# Patient Record
Sex: MALE | Race: WHITE | NOT HISPANIC OR LATINO | Employment: UNEMPLOYED | URBAN - METROPOLITAN AREA
[De-identification: names, ages, dates, MRNs, and addresses within clinical notes are randomized per-mention and may not be internally consistent; named-entity substitution may affect disease eponyms.]

---

## 2017-11-10 ENCOUNTER — TELEPHONE (OUTPATIENT)
Dept: TRANSPLANT | Facility: CLINIC | Age: 52
End: 2017-11-10

## 2017-11-10 NOTE — TELEPHONE ENCOUNTER
Spoke to pts sister, her complaints were about phone calls with the infectious disease department. I listened but in the end, told her I was not infectious disease. I informed her that her brothers records were sent to infectious disease. I will call for additional records in am. Pt is tenatively scheduled in hepatology.  He does have HCV but also has large ascites per sister and was told he has a mass on his last ultrasound.  She agreed he will not be initially seen for hCV till we determine cirrhosis status(meld) etc. No labs sent.

## 2017-11-13 ENCOUNTER — TELEPHONE (OUTPATIENT)
Dept: TRANSPLANT | Facility: CLINIC | Age: 52
End: 2017-11-13

## 2017-11-13 NOTE — TELEPHONE ENCOUNTER
Pt called accepted appt on wed 11/14. Instructed to fast after midnight inc. They want imaging. Pt agreed

## 2017-11-15 ENCOUNTER — HOSPITAL ENCOUNTER (OUTPATIENT)
Dept: RADIOLOGY | Facility: HOSPITAL | Age: 52
Discharge: HOME | End: 2017-11-15
Attending: PHYSICIAN ASSISTANT
Payer: MEDICAID

## 2017-11-15 ENCOUNTER — OFFICE VISIT (OUTPATIENT)
Dept: HEPATOLOGY | Facility: CLINIC | Age: 52
End: 2017-11-15
Payer: MEDICAID

## 2017-11-15 ENCOUNTER — TELEPHONE (OUTPATIENT)
Dept: INTERVENTIONAL RADIOLOGY/VASCULAR | Facility: HOSPITAL | Age: 52
End: 2017-11-15

## 2017-11-15 VITALS
HEART RATE: 96 BPM | SYSTOLIC BLOOD PRESSURE: 150 MMHG | TEMPERATURE: 97 F | OXYGEN SATURATION: 97 % | HEIGHT: 67 IN | WEIGHT: 141.31 LBS | BODY MASS INDEX: 22.18 KG/M2 | DIASTOLIC BLOOD PRESSURE: 78 MMHG | RESPIRATION RATE: 18 BRPM

## 2017-11-15 DIAGNOSIS — B18.2 CHRONIC HEPATITIS C WITHOUT HEPATIC COMA: ICD-10-CM

## 2017-11-15 DIAGNOSIS — R18.8 OTHER ASCITES: ICD-10-CM

## 2017-11-15 DIAGNOSIS — B18.2 CHRONIC HEPATITIS C WITHOUT HEPATIC COMA: Primary | ICD-10-CM

## 2017-11-15 PROCEDURE — 99204 OFFICE O/P NEW MOD 45 MIN: CPT | Mod: S$PBB,,, | Performed by: PHYSICIAN ASSISTANT

## 2017-11-15 PROCEDURE — 99999 PR PBB SHADOW E&M-EST. PATIENT-LVL IV: CPT | Mod: PBBFAC,,, | Performed by: PHYSICIAN ASSISTANT

## 2017-11-15 PROCEDURE — 99214 OFFICE O/P EST MOD 30 MIN: CPT | Mod: PBBFAC,25 | Performed by: PHYSICIAN ASSISTANT

## 2017-11-15 PROCEDURE — 74160 CT ABDOMEN W/CONTRAST: CPT | Mod: 26,,, | Performed by: RADIOLOGY

## 2017-11-15 PROCEDURE — 74160 CT ABDOMEN W/CONTRAST: CPT | Mod: TC

## 2017-11-15 PROCEDURE — 25500020 PHARM REV CODE 255

## 2017-11-15 RX ORDER — ACETAMINOPHEN 325 MG/1
325 TABLET ORAL EVERY 6 HOURS PRN
COMMUNITY

## 2017-11-15 RX ORDER — FAMOTIDINE 20 MG/1
TABLET, FILM COATED ORAL
Refills: 0 | COMMUNITY
Start: 2017-09-20

## 2017-11-15 RX ORDER — LACTULOSE 10 G/15ML
SOLUTION ORAL; RECTAL CONTINUOUS PRN
COMMUNITY

## 2017-11-15 NOTE — TELEPHONE ENCOUNTER
Phone call (complete to pt)   Arrival time-    9      Allergies reviewed  Directions given  Instructed to take meds in AM             Labs ( current )

## 2017-11-15 NOTE — PROGRESS NOTES
I have personally performed a face to face diagnostic evaluation on this patient. I have reviewed and agree with today's findings and the care plan outlined by Raymond Khan PA-C      My findings are as follows:  Patient presents with new onset ascites.    - new diagnosis of HCV infection  - also excessive alcohol use for years    Likely cirrhosis  Need to exclude HCC  May need OLT.    - start evaluation of his liver disease       he will return to Raymond Khan PA-C  for follow-up.

## 2017-11-15 NOTE — PROGRESS NOTES
HEPATOLOGY CLINIC VISIT NOTE     REFERRING PROVIDER: Mouna Eason NP     REASON FOR VISIT: ascites, HCV     HISTORY: This is a 52 y.o. White male here for evalauation of ascites and HCV, referred by PCP. Pt was in his normal state of health until 3 months ago when he started having abdominal distention. His PCP started him on lactulose. He was tested for HCV which was positive. Per his sister,pt reported to ED for abdominal pain where she reports he was told about a liver mass- no imaging included in records. Additionally, I have no recent LFTs. He does have chronic hepatitis C, genotype 1a; HCV ,000 IU/mL. He has noticeable ascites on exam with tense abdominal distention. He has never had a paracentesis. He has scleral icterus Pt denies signs of hepatic decompensation including: dark urine, hematemesis, melena, slowed mentation.    We discussed that pt likely has cirrhosis based on clinical presentation alone.      Pt admits that he does not routinely seek medical care. He denies any significant PMH.       Liver staging:  No formal staging, suspect cirrhosis based on long alcohol use and HCV  Ascites and scleral icterus  No recent LFTs     Cirrhosis history:  - Decompensated with ascites   - MELD score   -- will order labs to asses meld     - HCC screening:   - U/S: ?liver mass, will start with tpCT   - AFP: ordered today     (?)Portal HTN:   - EGD: ordered, has not had colonoscopy, will order.       Denies jaundice, dark urine, abdominal distention, hematemesis, melena, slowed mentation.   No abnormal skin rashes. No generalized joint pain.                     Past Medical History:   Diagnosis Date    Chronic hepatitis C      Past Surgical History:   Procedure Laterality Date    HAND SURGERY      TYMPANOSTOMY TUBE PLACEMENT       FAMILY HISTORY: Negative for liver disease    SOCIAL HISTORY:   History   Smoking Status    Not on file   Smokeless Tobacco    Not on file       History   Alcohol use Not  on file   beer- daily, 8-10 beers     History   Drug use: Unknown     ROS:   No fever, chills, weight loss, fatigue  No chest pain, , dyspnea, cough  (+) abdominal pain, no change in bowel pattern, nausea, vomiting  No dysuria, hematuria   No skin rashes   No headaches, visual changes  No lower extremity edema  No depression or anxiety      PHYSICAL EXAM:  Friendly White male, in no acute distress; alert and oriented to person, place and time  VITALS: reviewed  HEENT: Sclerae anicteric.   NECK: Supple  CVS: Regular rate and rhythm. No murmurs  LUNGS: Normal respiratory effort. Clear bilaterally  ABDOMEN: Flat, soft, nontender. No organomegaly or masses. (+) ascites  SKIN: Warm and dry. No jaundice, No obvious rashes.   EXTREMITIES: No lower extremity edema  NEURO/PSYCH: Normal gate. Memory intact. Thought and speech pattern appropriate. Behavior normal. No depression or anxiety noted.    RECENT LABS:  No results found for: WBC, HGB, PLT  No results found for: INR  No results found for: AST, ALT, BILITOT, ALBUMIN, ALKPHOS, CREATININE, BUN, NA, K, AFP      RECENT IMAGING:  none    ASSESSMENT  52 y.o. White male with:  1. DECOMPENSATED CIRRHOSIS WITH ASCITES  -- less likely, alcoholic hepatitis  -- will do full serological work up, assess LFTs and MELD  -- per sister reports a liver mass on outside imaging, will get AFP and tpCT here    - HCC screening:   - U/S: ?liver mass, will start with tpCT   - AFP: ordered today     (?)Portal HTN:   - EGD: ordered, has not had colonoscopy, will order.     2. ASCITES  -- will coordinate paracentesis  -- WBC, protein, albumin and culture  -- if CMP WNL, will start lasix 20mg and spironolactone 50mg.     3. CHRONIC HEPATITIS C  -- genotype 1a  -- treatment naive  -- HIV and hep A and B serologies    EDUCATION:  Discussed evidence that indicates that pt has cirrhosis.   -- Discussed the basics about liver fibrosis /scarring and how that relates to liver function. Reviewed the  significance of the MELD scoring system as it relates to indication for transplant.  -- Signs and symptoms of hepatic decompensation were reviewed, including jaundice, ascites, and slowed mentation due to hepatic encephalopathy. The patient should seek medical attention if any of these things occur. We discussed the potential for bleeding from esophageal varices with symptoms of hematemesis and melena. The patient should report to the Emergency Department for these symptoms.   -- We discussed the increased risk of hepatocellular carcinoma due to cirrhosis.   Continued screening every six months with ultrasound and AFP is recommended.   -- Discussed portal hypertension, including potential development of esophageal varices. Role of EGD in screening for varices was reviewed.   Cirrhosis education booklet given to pt    Recommended patient avoid alcohol and raw seafood. Limit tylenol to 2000mg daily    The natural history of Hepatitis C, including potential progression to cirrhosis was reviewed. Complications of cirrhosis, including hepatic decompensation, liver cancer, liver failure, need for liver transplant, and death were reviewed.     We discussed the increased progression of liver disease secondary to alcohol use; patient was advised to avoid alcohol completely.     Transmission of Hepatitis C was reviewed, including possible sexual transmission. Sexual contacts should be screened.     Risk of vertical transmission of Hepatitis C from mother to baby was reviewed. Children should be screened.     Patient should avoid sharing personal products such as razors, toothbrushes, etc.     We reviewed that vaccination against Hepatitis A and Hepatitis B is recommended if patient does not already have immunity.    Recommend avoiding raw seafood.    Limit acetaminophen to 2000mg daily.    PLAN:  1. Labs today  2. CT  3. Paracentesis  4. Follow up visit in 3 weeks     Thank you for allowing me to participate in the care of  Shahid Khan PA-C

## 2017-11-15 NOTE — LETTER
November 16, 2017      Mouna Eason, [facility]  [address]  [facility] [address]           [facility] - Hepatology  [address]  [facility] [address]  Phone: [phone]  Fax: [phone]          Patient: Shahid Vizcarra   MR Number: [id number]   YOB: 1965   Date of Visit: 11/15/2017       Dear Mouna Eason:    Thank you for referring Shahid Vizcarra to me for evaluation. Attached you will find relevant portions of my assessment and plan of care.    If you have questions, please do not hesitate to call me. I look forward to following Shahid Vizcarra along with you.    Sincerely,    Raymond Khan PA-C    Enclosure  CC:  No Recipients    If you would like to receive this communication electronically, please contact [email or web address] or [phone] to request more information on [facility] access.    For providers and/or their staff who would like to refer a patient to [facility], please contact us through our one-stop-shop provider referral line, [facility], at [phone].    If you feel you have received this communication in error or would no longer like to receive these types of communications, please e-mail [email or web address]

## 2017-11-16 ENCOUNTER — TELEPHONE (OUTPATIENT)
Dept: HEPATOLOGY | Facility: CLINIC | Age: 52
End: 2017-11-16

## 2017-11-16 ENCOUNTER — HOSPITAL ENCOUNTER (OUTPATIENT)
Dept: INTERVENTIONAL RADIOLOGY/VASCULAR | Facility: HOSPITAL | Age: 52
Discharge: HOME | End: 2017-11-16
Attending: PHYSICIAN ASSISTANT
Payer: MEDICAID

## 2017-11-16 VITALS
SYSTOLIC BLOOD PRESSURE: 148 MMHG | OXYGEN SATURATION: 97 % | HEART RATE: 94 BPM | DIASTOLIC BLOOD PRESSURE: 80 MMHG | RESPIRATION RATE: 18 BRPM

## 2017-11-16 DIAGNOSIS — R18.8 OTHER ASCITES: ICD-10-CM

## 2017-11-16 DIAGNOSIS — B18.2 CHRONIC HEPATITIS C WITHOUT HEPATIC COMA: ICD-10-CM

## 2017-11-16 PROCEDURE — 76705 ECHO EXAM OF ABDOMEN: CPT | Mod: TC

## 2017-11-16 PROCEDURE — 76705 ECHO EXAM OF ABDOMEN: CPT | Mod: 26,,, | Performed by: RADIOLOGY

## 2017-11-16 RX ORDER — FUROSEMIDE 20 MG/1
20 TABLET ORAL DAILY
Qty: 30 TABLET | Refills: 1 | Status: SHIPPED | OUTPATIENT
Start: 2017-11-16 | End: 2017-12-11 | Stop reason: SDUPTHER

## 2017-11-16 RX ORDER — SPIRONOLACTONE 50 MG/1
50 TABLET, FILM COATED ORAL DAILY
Qty: 30 TABLET | Refills: 1 | Status: SHIPPED | OUTPATIENT
Start: 2017-11-16 | End: 2017-12-11 | Stop reason: SDUPTHER

## 2017-11-16 NOTE — PROGRESS NOTES
During ultrasound evaluation, no ascites identified for a safe paracentesis. No paracentesis performed

## 2017-11-16 NOTE — TELEPHONE ENCOUNTER
Patient: Shahid Vizcarra       MRN: [id number]      : 1965     Age: 52 y.o.  [address] [address]    Provider: YUSUF - JORDYN Berman    Patient Transplant Status: Not a candidate    Reason for presentation: Indeterminate lesion    Clinical Summary: This is a 52 y.o. White male , who  was in his normal state of health until 3 months ago when he developed abdominal distention. He was tested for HCV which was positive. He established care in hepatology but did not come with LFTs but has documentation to support HCV.  Per his sister,pt reported to ED for abdominal pain where she reports he was told about a liver mass- no imaging included in records. His AFP was 910 . Given h/o liver mass, tpCT ordered, multiple lesions concerning for HCC. Portal vein thrombus noted as well.       Imaging to be reviewed: tpCT    HCC Treatment History: n/a    ABO:     Platelets:   Lab Results   Component Value Date/Time     11/15/2017 11:15 AM     Creatinine:   Lab Results   Component Value Date/Time    CREATININE 0.8 11/15/2017 11:15 AM     Bilirubin:   Lab Results   Component Value Date/Time    BILITOT 4.3 (H) 11/15/2017 11:15 AM     AFP Last 3 each if available:   Lab Results   Component Value Date/Time     (H) 11/15/2017 11:15 AM       MELD: MELD-Na score: 14 at 11/15/2017 11:15 AM  MELD score: 14 at 11/15/2017 11:15 AM  Calculated from:  Serum Creatinine: 0.8 mg/dL (Rounded to 1) at 11/15/2017 11:15 AM  Serum Sodium: 137 mmol/L at 11/15/2017 11:15 AM  Total Bilirubin: 4.3 mg/dL at 11/15/2017 11:15 AM  INR(ratio): 1.2 at 11/15/2017 11:15 AM  Age: 52 years    Plan:     Follow-up Provider:

## 2017-11-16 NOTE — H&P
Radiology History & Physical      SUBJECTIVE:     Chief Complaint: abdominal distention    History of Present Illness:  Shahid Vizcarra is a 52 y.o. male who presents for ultrasound guided paracentesis  Past Medical History:   Diagnosis Date    Chronic hepatitis C      Past Surgical History:   Procedure Laterality Date    HAND SURGERY      TYMPANOSTOMY TUBE PLACEMENT         Home Meds:   Prior to Admission medications    Medication Sig Start Date End Date Taking? Authorizing Provider   acetaminophen (TYLENOL) 325 MG tablet Take 325 mg by mouth every 6 (six) hours as needed for Pain.    Historical Provider, MD   famotidine (PEPCID) 20 MG tablet TK 1 T PO Q 12 H PRF ABDOMINAL DISCOMFORT 9/20/17   Historical Provider, MD   furosemide (LASIX) 20 MG tablet Take 1 tablet (20 mg total) by mouth once daily. 11/16/17 11/16/18  Raymond Khan PA-C   lactulose (CHRONULAC) 10 gram/15 mL solution Take by mouth continuous prn.    Historical Provider, MD   spironolactone (ALDACTONE) 50 MG tablet Take 1 tablet (50 mg total) by mouth once daily. 11/16/17 11/16/18  Raymond Khan PA-C     Anticoagulants/Antiplatelets: no anticoagulation    Allergies: Review of patient's allergies indicates:  No Known Allergies  Sedation History:  no adverse reactions    Review of Systems:   Hematological: no known coagulopathies  Respiratory: no shortness of breath  Cardiovascular: no chest pain  Gastrointestinal: no abdominal pain  Genito-Urinary: no dysuria  Musculoskeletal: negative  Neurological: no TIA or stroke symptoms         OBJECTIVE:     Vital Signs (Most Recent)  Pulse: 94 (11/16/17 0900)  Resp: 18 (11/16/17 0900)  BP: (!) 148/80 (11/16/17 0900)  SpO2: 97 % (11/16/17 0900)    Physical Exam:  ASA: 2  Mallampati: n/a    General: no acute distress  Mental Status: alert and oriented to person, place and time  HEENT: normocephalic, atraumatic  Chest: unlabored breathing  Heart: regular heart rate  Abdomen: distended  Extremity: moves all  extremities    Laboratory  Lab Results   Component Value Date    INR 1.2 11/15/2017       Lab Results   Component Value Date    WBC 9.29 11/15/2017    HGB 14.6 11/15/2017    HCT 42.9 11/15/2017     (H) 11/15/2017     11/15/2017      Lab Results   Component Value Date    GLU 87 11/15/2017     11/15/2017    K 4.1 11/15/2017    CL 99 11/15/2017    CO2 25 11/15/2017    BUN 12 11/15/2017    CREATININE 0.8 11/15/2017    CALCIUM 9.7 11/15/2017     (H) 11/15/2017     (H) 11/15/2017    ALBUMIN 2.5 (L) 11/15/2017    BILITOT 4.3 (H) 11/15/2017    BILIDIR 2.7 (H) 11/15/2017       ASSESSMENT/PLAN:     Sedation Plan: local  Patient will undergo ultrasound guided paracentesis.    LIGIA Izaguirre, FNP  Interventional Radiology  [phone] [employer]

## 2017-11-17 ENCOUNTER — TELEPHONE (OUTPATIENT)
Dept: HEPATOLOGY | Facility: CLINIC | Age: 52
End: 2017-11-17

## 2017-11-17 DIAGNOSIS — K74.60 CIRRHOSIS OF LIVER WITHOUT ASCITES, UNSPECIFIED HEPATIC CIRRHOSIS TYPE: Primary | ICD-10-CM

## 2017-11-17 DIAGNOSIS — R18.8 OTHER ASCITES: ICD-10-CM

## 2017-11-17 NOTE — TELEPHONE ENCOUNTER
Patient: Shahid Vizcarra       MRN: [id number]      : 1965     Age: 52 y.o.  [address] [address]    Provider: YUSUF - LES Berman    Patient Transplant Status: Not a candidate    Reason for presentation: Indeterminate lesion    Clinical Summary: This is a 52 y.o. White male , who  was in his normal state of health until 3 months ago when he developed abdominal distention. He was tested for HCV which was positive. He established care in hepatology but did not come with LFTs but has documentation to support HCV.  Per his sister,pt reported to ED for abdominal pain where she reports he was told about a liver mass- no imaging included in records. His AFP was 910 . Given h/o liver mass, tpCT ordered, multiple lesions concerning for HCC. Portal vein thrombus noted as well.       Imaging to be reviewed: tpCT    HCC Treatment History: n/a    ABO:     Platelets:   Lab Results   Component Value Date/Time     11/15/2017 11:15 AM     Creatinine:   Lab Results   Component Value Date/Time    CREATININE 0.8 11/15/2017 11:15 AM     Bilirubin:   Lab Results   Component Value Date/Time    BILITOT 4.3 (H) 11/15/2017 11:15 AM     AFP Last 3 each if available:   Lab Results   Component Value Date/Time     (H) 11/15/2017 11:15 AM       MELD: MELD-Na score: 14 at 11/15/2017 11:15 AM  MELD score: 14 at 11/15/2017 11:15 AM  Calculated from:  Serum Creatinine: 0.8 mg/dL (Rounded to 1) at 11/15/2017 11:15 AM  Serum Sodium: 137 mmol/L at 11/15/2017 11:15 AM  Total Bilirubin: 4.3 mg/dL at 11/15/2017 11:15 AM  INR(ratio): 1.2 at 11/15/2017 11:15 AM  Age: 52 years    Plan: Diffiusely heterogeneous appearance on CT. Atleast 4 lesions identified with washout (Segment II at 4.2 cm), segment 2 at 1.2 cm, sement VI at 3.2 cm, and IV at 1.6 cm. I suspect there are multiple more and would recommend MRI.  Partial PVT.  tbili 4.3    Follow-up Provider: Les Berman

## 2017-11-17 NOTE — TELEPHONE ENCOUNTER
Pt called to discuss CT scan concerning for HCC. Pt sister also phoned as well. Scan submitted for IR review. Pt started lasix and spironolactone today.     Please schedule for BMP in 1 week    Thanks

## 2017-11-21 ENCOUNTER — TELEPHONE (OUTPATIENT)
Dept: HEPATOLOGY | Facility: CLINIC | Age: 52
End: 2017-11-21

## 2017-11-21 ENCOUNTER — CONFERENCE (OUTPATIENT)
Dept: TRANSPLANT | Facility: CLINIC | Age: 52
End: 2017-11-21

## 2017-11-21 DIAGNOSIS — R16.0 LIVER MASS: ICD-10-CM

## 2017-11-21 DIAGNOSIS — K74.60 CIRRHOSIS OF LIVER WITHOUT ASCITES, UNSPECIFIED HEPATIC CIRRHOSIS TYPE: Primary | ICD-10-CM

## 2017-11-21 DIAGNOSIS — K62.5 BRBPR (BRIGHT RED BLOOD PER RECTUM): ICD-10-CM

## 2017-11-21 NOTE — TELEPHONE ENCOUNTER
----- Message from Clarisse Matute RN sent at 11/21/2017 10:29 AM CST -----  I spoke with patient.  He reports losing 4 lbs after start of diuretics.  Patient reports that for the last 2 weeks approximately 95% of the time he's passing blood with each stool.  He states that approximately a tablespoon of blood is lost each time and that blood clots look dark red.  He does a history of hemmrroids.

## 2017-11-24 ENCOUNTER — TELEPHONE (OUTPATIENT)
Dept: ENDOSCOPY | Facility: HOSPITAL | Age: 52
End: 2017-11-24

## 2017-11-24 ENCOUNTER — LAB VISIT (OUTPATIENT)
Dept: LAB | Facility: HOSPITAL | Age: 52
End: 2017-11-24
Payer: MEDICAID

## 2017-11-24 DIAGNOSIS — Z12.11 SPECIAL SCREENING FOR MALIGNANT NEOPLASMS, COLON: Primary | ICD-10-CM

## 2017-11-24 DIAGNOSIS — K74.60 CIRRHOSIS OF LIVER WITHOUT ASCITES, UNSPECIFIED HEPATIC CIRRHOSIS TYPE: ICD-10-CM

## 2017-11-24 DIAGNOSIS — K74.69 OTHER CIRRHOSIS OF LIVER: Primary | ICD-10-CM

## 2017-11-24 DIAGNOSIS — R18.8 OTHER ASCITES: ICD-10-CM

## 2017-11-24 LAB
ANION GAP SERPL CALC-SCNC: 6 MMOL/L
BUN SERPL-MCNC: 10 MG/DL
CALCIUM SERPL-MCNC: 9.1 MG/DL
CHLORIDE SERPL-SCNC: 101 MMOL/L
CO2 SERPL-SCNC: 25 MMOL/L
CREAT SERPL-MCNC: 0.8 MG/DL
EST. GFR  (AFRICAN AMERICAN): >60 ML/MIN/1.73 M^2
EST. GFR  (NON AFRICAN AMERICAN): >60 ML/MIN/1.73 M^2
GLUCOSE SERPL-MCNC: 132 MG/DL
POTASSIUM SERPL-SCNC: 4.2 MMOL/L
SODIUM SERPL-SCNC: 132 MMOL/L

## 2017-11-24 PROCEDURE — 80048 BASIC METABOLIC PNL TOTAL CA: CPT

## 2017-11-24 PROCEDURE — 36415 COLL VENOUS BLD VENIPUNCTURE: CPT

## 2017-11-24 RX ORDER — POLYETHYLENE GLYCOL 3350, SODIUM SULFATE ANHYDROUS, SODIUM BICARBONATE, SODIUM CHLORIDE, POTASSIUM CHLORIDE 236; 22.74; 6.74; 5.86; 2.97 G/4L; G/4L; G/4L; G/4L; G/4L
4 POWDER, FOR SOLUTION ORAL ONCE
Qty: 4000 ML | Refills: 0 | Status: SHIPPED | OUTPATIENT
Start: 2017-11-24 | End: 2017-11-24

## 2017-11-27 DIAGNOSIS — K74.60 CIRRHOSIS OF LIVER WITHOUT ASCITES, UNSPECIFIED HEPATIC CIRRHOSIS TYPE: Primary | ICD-10-CM

## 2017-11-28 ENCOUNTER — TELEPHONE (OUTPATIENT)
Dept: HEPATOLOGY | Facility: CLINIC | Age: 52
End: 2017-11-28

## 2017-11-28 NOTE — TELEPHONE ENCOUNTER
----- Message from Raymond Khan PA-C sent at 11/27/2017  3:50 PM CST -----  Please let him know that these labs are stable  Thanks

## 2017-11-30 ENCOUNTER — TELEPHONE (OUTPATIENT)
Dept: HEPATOLOGY | Facility: CLINIC | Age: 52
End: 2017-11-30

## 2017-11-30 NOTE — TELEPHONE ENCOUNTER
Patient: Shahid Vizcarra       MRN: [id number]      : 1965     Age: 52 y.o.  [address] [address]    Provider: YUSUF - JORDYN Berman    Patient Transplant Status: Not a candidate    Reason for presentation: Indeterminate lesion    Clinical Summary: This is a 52 y.o. White male , who  was in his normal state of health until 3 months ago when he developed abdominal distention. He was tested for HCV which was positive. He established care in hepatology but did not come with LFTs but has documentation to support HCV.  Per his sister,pt reported to ED for abdominal pain where she reports he was told about a liver mass- no imaging included in records. His AFP was 910 . Given h/o liver mass, tpCT ordered, multiple lesions concerning for HCC. Portal vein thrombus noted as well. MRI scheduled     Imaging to be reviewed: tpCT, MRI    HCC Treatment History: n/a    ABO:     Platelets:   Lab Results   Component Value Date/Time     11/15/2017 11:15 AM     Creatinine:   Lab Results   Component Value Date/Time    CREATININE 0.8 2017 11:06 AM     Bilirubin:   Lab Results   Component Value Date/Time    BILITOT 4.3 (H) 11/15/2017 11:15 AM     AFP Last 3 each if available:   Lab Results   Component Value Date/Time     (H) 11/15/2017 11:15 AM       MELD: MELD-Na score: 14 at 11/15/2017 11:15 AM  MELD score: 14 at 11/15/2017 11:15 AM  Calculated from:  Serum Creatinine: 0.8 mg/dL (Rounded to 1) at 11/15/2017 11:15 AM  Serum Sodium: 137 mmol/L at 11/15/2017 11:15 AM  Total Bilirubin: 4.3 mg/dL at 11/15/2017 11:15 AM  INR(ratio): 1.2 at 11/15/2017 11:15 AM  Age: 52 years    Plan:     Follow-up Provider:

## 2017-11-30 NOTE — TELEPHONE ENCOUNTER
Pt notified of stable lab results via VM left onpts VM today asked pt to contact us back with any further questions or concerns.

## 2017-11-30 NOTE — TELEPHONE ENCOUNTER
----- Message from Raymond Khan PA-C sent at 11/29/2017  3:32 PM CST -----  Please let him know that these labs are stable   Thanks

## 2017-12-01 ENCOUNTER — HOSPITAL ENCOUNTER (OUTPATIENT)
Dept: RADIOLOGY | Facility: HOSPITAL | Age: 52
Discharge: HOME | End: 2017-12-01
Attending: PHYSICIAN ASSISTANT
Payer: MEDICAID

## 2017-12-01 DIAGNOSIS — K74.60 CIRRHOSIS OF LIVER WITHOUT ASCITES, UNSPECIFIED HEPATIC CIRRHOSIS TYPE: ICD-10-CM

## 2017-12-01 DIAGNOSIS — R16.0 LIVER MASS: ICD-10-CM

## 2017-12-01 PROCEDURE — 25500020 PHARM REV CODE 255: Performed by: PHYSICIAN ASSISTANT

## 2017-12-01 PROCEDURE — A9585 GADOBUTROL INJECTION: HCPCS | Performed by: PHYSICIAN ASSISTANT

## 2017-12-01 PROCEDURE — 74183 MRI ABD W/O CNTR FLWD CNTR: CPT | Mod: 26,,, | Performed by: RADIOLOGY

## 2017-12-01 PROCEDURE — 74183 MRI ABD W/O CNTR FLWD CNTR: CPT | Mod: TC

## 2017-12-01 RX ORDER — GADOBUTROL 604.72 MG/ML
10 INJECTION INTRAVENOUS
Status: COMPLETED | OUTPATIENT
Start: 2017-12-01 | End: 2017-12-01

## 2017-12-01 RX ADMIN — GADOBUTROL 10 ML: 604.72 INJECTION INTRAVENOUS at 04:12

## 2017-12-01 NOTE — TELEPHONE ENCOUNTER
Patient: Shahid Vizcarra       MRN: [id number]      : 1965     Age: 52 y.o.  [address] [address]    Provider: YUSUF - JORDYN Berman    Patient Transplant Status: Not a candidate    Reason for presentation: Indeterminate lesion    Clinical Summary: This is a 52 y.o. White male , who  was in his normal state of health until 3 months ago when he developed abdominal distention. He was tested for HCV which was positive. He established care in hepatology but did not come with LFTs but has documentation to support HCV.  Per his sister,pt reported to ED for abdominal pain where she reports he was told about a liver mass- no imaging included in records. His AFP was 910 . Given h/o liver mass, tpCT ordered, multiple lesions concerning for HCC. Portal vein thrombus noted as well. MRI scheduled     Imaging to be reviewed: tpCT, MRI    HCC Treatment History: n/a    ABO:     Platelets:   Lab Results   Component Value Date/Time     11/15/2017 11:15 AM     Creatinine:   Lab Results   Component Value Date/Time    CREATININE 0.8 2017 11:06 AM     Bilirubin:   Lab Results   Component Value Date/Time    BILITOT 4.3 (H) 11/15/2017 11:15 AM     AFP Last 3 each if available:   Lab Results   Component Value Date/Time     (H) 11/15/2017 11:15 AM       MELD: MELD-Na score: 14 at 11/15/2017 11:15 AM  MELD score: 14 at 11/15/2017 11:15 AM  Calculated from:  Serum Creatinine: 0.8 mg/dL (Rounded to 1) at 11/15/2017 11:15 AM  Serum Sodium: 137 mmol/L at 11/15/2017 11:15 AM  Total Bilirubin: 4.3 mg/dL at 11/15/2017 11:15 AM  INR(ratio): 1.2 at 11/15/2017 11:15 AM  Age: 52 years    Plan: MRI and CT show diffuse, infiltrative type process, with some discrete masses which demonstrate washout and are consistent with HCC. 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 Would not offer liver directed therapy. Rec: referral to Hospice.    Follow-up Provider: JORDYN Berman

## 2017-12-05 ENCOUNTER — CONFERENCE (OUTPATIENT)
Dept: TRANSPLANT | Facility: CLINIC | Age: 52
End: 2017-12-05

## 2017-12-05 ENCOUNTER — ANESTHESIA (OUTPATIENT)
Dept: ENDOSCOPY | Facility: HOSPITAL | Age: 52
End: 2017-12-05
Payer: MEDICAID

## 2017-12-05 ENCOUNTER — ANESTHESIA EVENT (OUTPATIENT)
Dept: ENDOSCOPY | Facility: HOSPITAL | Age: 52
End: 2017-12-05
Payer: MEDICAID

## 2017-12-05 ENCOUNTER — SURGERY (OUTPATIENT)
Age: 52
End: 2017-12-05

## 2017-12-05 ENCOUNTER — HOSPITAL ENCOUNTER (OUTPATIENT)
Facility: HOSPITAL | Age: 52
Discharge: HOME | End: 2017-12-05
Attending: INTERNAL MEDICINE | Admitting: INTERNAL MEDICINE
Payer: MEDICAID

## 2017-12-05 VITALS
DIASTOLIC BLOOD PRESSURE: 70 MMHG | WEIGHT: 136 LBS | HEART RATE: 88 BPM | SYSTOLIC BLOOD PRESSURE: 126 MMHG | BODY MASS INDEX: 21.35 KG/M2 | OXYGEN SATURATION: 99 % | HEIGHT: 67 IN | TEMPERATURE: 98 F | RESPIRATION RATE: 16 BRPM

## 2017-12-05 VITALS — RESPIRATION RATE: 19 BRPM

## 2017-12-05 DIAGNOSIS — K74.60 CIRRHOSIS OF LIVER WITHOUT ASCITES, UNSPECIFIED HEPATIC CIRRHOSIS TYPE: Primary | ICD-10-CM

## 2017-12-05 PROCEDURE — D9220A PRA ANESTHESIA: Mod: 33,CRNA,, | Performed by: NURSE ANESTHETIST, CERTIFIED REGISTERED

## 2017-12-05 PROCEDURE — 88305 TISSUE EXAM BY PATHOLOGIST: CPT | Performed by: PATHOLOGY

## 2017-12-05 PROCEDURE — D9220A PRA ANESTHESIA: Mod: 33,ANES,, | Performed by: ANESTHESIOLOGY

## 2017-12-05 PROCEDURE — 63600175 PHARM REV CODE 636 W HCPCS: Performed by: NURSE ANESTHETIST, CERTIFIED REGISTERED

## 2017-12-05 PROCEDURE — 25000003 PHARM REV CODE 250: Performed by: INTERNAL MEDICINE

## 2017-12-05 PROCEDURE — 27201022: Performed by: INTERNAL MEDICINE

## 2017-12-05 PROCEDURE — 27201089 HC SNARE, DISP (ANY): Performed by: INTERNAL MEDICINE

## 2017-12-05 PROCEDURE — 43244 EGD VARICES LIGATION: CPT | Mod: 51,,, | Performed by: INTERNAL MEDICINE

## 2017-12-05 PROCEDURE — 37000009 HC ANESTHESIA EA ADD 15 MINS: Performed by: INTERNAL MEDICINE

## 2017-12-05 PROCEDURE — 45385 COLONOSCOPY W/LESION REMOVAL: CPT | Mod: ,,, | Performed by: INTERNAL MEDICINE

## 2017-12-05 PROCEDURE — 45385 COLONOSCOPY W/LESION REMOVAL: CPT | Performed by: INTERNAL MEDICINE

## 2017-12-05 PROCEDURE — 37000008 HC ANESTHESIA 1ST 15 MINUTES: Performed by: INTERNAL MEDICINE

## 2017-12-05 PROCEDURE — 88305 TISSUE EXAM BY PATHOLOGIST: CPT | Mod: 26,,, | Performed by: PATHOLOGY

## 2017-12-05 PROCEDURE — 43244 EGD VARICES LIGATION: CPT | Performed by: INTERNAL MEDICINE

## 2017-12-05 RX ORDER — SODIUM CHLORIDE 9 MG/ML
INJECTION, SOLUTION INTRAVENOUS CONTINUOUS
Status: DISCONTINUED | OUTPATIENT
Start: 2017-12-05 | End: 2017-12-05 | Stop reason: HOSPADM

## 2017-12-05 RX ORDER — LIDOCAINE HCL/PF 100 MG/5ML
SYRINGE (ML) INTRAVENOUS
Status: DISCONTINUED | OUTPATIENT
Start: 2017-12-05 | End: 2017-12-05

## 2017-12-05 RX ORDER — PROPOFOL 10 MG/ML
VIAL (ML) INTRAVENOUS
Status: DISCONTINUED | OUTPATIENT
Start: 2017-12-05 | End: 2017-12-05

## 2017-12-05 RX ORDER — PROPOFOL 10 MG/ML
VIAL (ML) INTRAVENOUS CONTINUOUS PRN
Status: DISCONTINUED | OUTPATIENT
Start: 2017-12-05 | End: 2017-12-05

## 2017-12-05 RX ADMIN — PROPOFOL 100 MG: 10 INJECTION, EMULSION INTRAVENOUS at 10:12

## 2017-12-05 RX ADMIN — LIDOCAINE HYDROCHLORIDE 40 MG: 20 INJECTION, SOLUTION INTRAVENOUS at 10:12

## 2017-12-05 RX ADMIN — PROPOFOL 40 MG: 10 INJECTION, EMULSION INTRAVENOUS at 10:12

## 2017-12-05 RX ADMIN — PROPOFOL 50 MG: 10 INJECTION, EMULSION INTRAVENOUS at 10:12

## 2017-12-05 RX ADMIN — PROPOFOL 150 MCG/KG/MIN: 10 INJECTION, EMULSION INTRAVENOUS at 10:12

## 2017-12-05 RX ADMIN — SODIUM CHLORIDE: 0.9 INJECTION, SOLUTION INTRAVENOUS at 09:12

## 2017-12-05 NOTE — PATIENT INSTRUCTIONS
Discharge Summary/Instructions after an Endoscopic Procedure  Patient Name: Shahid Vizcarra  Patient MRN: [id number]  Patient YOB: 1965  Tuesday, December 05, 2017  Theodore Umaña MD  RESTRICTIONS:  During your procedure today, you received medications for sedation.  These   medications may affect your judgment, balance and coordination.  Therefore,   for 24 hours, you have the following restrictions:   - DO NOT drive a car, operate machinery, make legal/financial decisions,   sign important papers or drink alcohol.    ACTIVITY:  The following day: return to full activity including work, except no heavy   lifting, straining or running for 3 days if polyps were removed.  DIET:  Eat and drink normally unless instructed otherwise.     TREATMENT FOR COMMON SIDE EFFECTS:  - Mild abdominal pain, belching, bloating or excessive gas: rest, eat   lightly and use a heating pad.  - Sore Throat: treat with throat lozenges and/or gargle with warm salt   water.  SYMPTOMS TO WATCH FOR AND REPORT TO YOUR PHYSICIAN:  1. Abdominal pain or bloating, other than gas cramps.  2. Chest pain.  3. Back pain.  4. Chills or fever occurring within 24 hours after the procedure.  5. Rectal bleeding, which would show as bright red, maroon, or black stools.   (A tablespoon of blood from the rectum is not serious, especially if   hemorrhoids are present.)  6. Vomiting.  7. Weakness or dizziness.  8. Because air was used during the procedure, expelling large amounts of air   from your rectum or belching is normal.  9. If a bowel prep was taken, you may not have a bowel movement for 1-3   days.  This is normal.  GO DIRECTLY TO THE NEAREST EMERGENCY ROOM IF YOU HAVE ANY OF THE FOLLOWING:      Difficulty breathing  Chills and/or fever over 101 F   Persistent vomiting and/or vomiting blood   Severe abdominal pain   Severe chest pain   Black, tarry stools   Bleeding- more than one tablespoon   Any other symptom or condition that you may feel needs  urgent attention  Your doctor recommends these additional instructions:  If any biopsies were taken, your doctor s clinic will contact you in 1 to 2   weeks with any results.  You have a contact number available for emergencies.  The signs and symptoms   of potential delayed complications were discussed with you.  You may return   to normal activities tomorrow.  Written discharge instructions were   provided to you.   You are being discharged to home.   Resume your previous diet.   Continue your present medications.   Your physician has recommended a repeat upper endoscopy in one month for   retreatment.   Return to your liver clinic as previously scheduled.  For questions, problems or results please call your physician - Theodore Umaña MD at Work:  [phone].  [facility] [place], EMERGENCY ROOM PHONE NUMBER: [phone]  IF A COMPLICATION OR EMERGENCY SITUATION ARISES AND YOU ARE UNABLE TO REACH   YOUR PHYSICIAN - GO DIRECTLY TO THE EMERGENCY ROOM.  Theodore Umaña MD  12/5/2017 10:14:10 AM  This report has been verified and signed electronically.

## 2017-12-05 NOTE — ANESTHESIA PREPROCEDURE EVALUATION
12/05/2017  Shahid Vizcarra is a 52 y.o., male.  Patient Active Problem List   Diagnosis    Chronic hepatitis C without hepatic coma    Cirrhosis of liver without ascites    Liver mass       Anesthesia Evaluation    I have reviewed the Patient Summary Reports.    I have reviewed the Nursing Notes.   I have reviewed the Medications.     Review of Systems      Physical Exam  General:  Well nourished    Airway/Jaw/Neck:  Airway Findings: Mouth Opening: Normal General Airway Assessment: Adult  Mallampati: II  Improves to II with phonation.  Jaw/Neck Findings:  Limited Ability to Prognath  Neck ROM: Normal ROM     Eyes/Ears/Nose:  Eyes/Ears/Nose Findings:    Dental:  Dental Findings: In tact   Chest/Lungs:  Chest/Lungs Findings: Clear to auscultation, Normal Respiratory Rate     Heart/Vascular:  Heart Findings: Rate: Normal  Rhythm: Regular Rhythm  Sounds: Normal     Abdomen:  Abdomen Findings:  Normal     Musculoskeletal:  Musculoskeletal Findings:    Skin:  Skin Findings:     Mental Status:  Mental Status Findings:  Cooperative, Alert and Oriented         Anesthesia Plan  Type of Anesthesia, risks & benefits discussed:  Anesthesia Type:  general, MAC  Patient's Preference:   Intra-op Monitoring Plan:   Intra-op Monitoring Plan Comments:   Post Op Pain Control Plan:   Post Op Pain Control Plan Comments:   Induction:   IV  Beta Blocker:  Patient is not currently on a Beta-Blocker (No further documentation required).       Informed Consent: Patient understands risks and agrees with Anesthesia plan.  Questions answered. Anesthesia consent signed with patient.  ASA Score: 3     Day of Surgery Review of History & Physical:    H&P update referred to the surgeon.         Ready For Surgery From Anesthesia Perspective.

## 2017-12-05 NOTE — PATIENT INSTRUCTIONS
Discharge Summary/Instructions after an Endoscopic Procedure  Patient Name: Shahid Vizcarra  Patient MRN: [id number]  Patient YOB: 1965  Tuesday, December 05, 2017  Theodore Umaña MD  RESTRICTIONS:  During your procedure today, you received medications for sedation.  These   medications may affect your judgment, balance and coordination.  Therefore,   for 24 hours, you have the following restrictions:   - DO NOT drive a car, operate machinery, make legal/financial decisions,   sign important papers or drink alcohol.    ACTIVITY:  The following day: return to full activity including work, except no heavy   lifting, straining or running for 3 days if polyps were removed.  DIET:  Eat and drink normally unless instructed otherwise.     TREATMENT FOR COMMON SIDE EFFECTS:  - Mild abdominal pain, belching, bloating or excessive gas: rest, eat   lightly and use a heating pad.  - Sore Throat: treat with throat lozenges and/or gargle with warm salt   water.  SYMPTOMS TO WATCH FOR AND REPORT TO YOUR PHYSICIAN:  1. Abdominal pain or bloating, other than gas cramps.  2. Chest pain.  3. Back pain.  4. Chills or fever occurring within 24 hours after the procedure.  5. Rectal bleeding, which would show as bright red, maroon, or black stools.   (A tablespoon of blood from the rectum is not serious, especially if   hemorrhoids are present.)  6. Vomiting.  7. Weakness or dizziness.  8. Because air was used during the procedure, expelling large amounts of air   from your rectum or belching is normal.  9. If a bowel prep was taken, you may not have a bowel movement for 1-3   days.  This is normal.  GO DIRECTLY TO THE NEAREST EMERGENCY ROOM IF YOU HAVE ANY OF THE FOLLOWING:      Difficulty breathing  Chills and/or fever over 101 F   Persistent vomiting and/or vomiting blood   Severe abdominal pain   Severe chest pain   Black, tarry stools   Bleeding- more than one tablespoon   Any other symptom or condition that you may feel needs  urgent attention  Your doctor recommends these additional instructions:  If any biopsies were taken, your doctor s clinic will contact you in 1 to 2   weeks with any results.  You have a contact number available for emergencies.  The signs and symptoms   of potential delayed complications were discussed with you.  You may return   to normal activities tomorrow.  Written discharge instructions were   provided to you.   You are being discharged to home.   Resume your previous diet.   Continue your present medications.   We are waiting for your pathology results.   Your physician has recommended a repeat colonoscopy in five years for   surveillance.  For questions, problems or results please call your physician - Theodore Umaña MD at Work:  [phone].  [facility], EMERGENCY ROOM PHONE NUMBER: [phone]  IF A COMPLICATION OR EMERGENCY SITUATION ARISES AND YOU ARE UNABLE TO REACH   YOUR PHYSICIAN - GO DIRECTLY TO THE EMERGENCY ROOM.  Theodore Umaña MD  12/5/2017 10:38:19 AM  This report has been verified and signed electronically.

## 2017-12-05 NOTE — ANESTHESIA POSTPROCEDURE EVALUATION
"Anesthesia Post Evaluation    Patient: Shahid Vizcarra    Procedure(s) Performed: Procedure(s) (LRB):  ESOPHAGOGASTRODUODENOSCOPY (EGD), EV screening (N/A)  COLONOSCOPY, BRBPR (N/A)    Final Anesthesia Type: general  Patient location during evaluation: PACU  Patient participation: Yes- Able to Participate  Level of consciousness: awake and alert and oriented  Pain management: adequate  Airway patency: patent  PONV status at discharge: No PONV  Anesthetic complications: no      Cardiovascular status: blood pressure returned to baseline and hemodynamically stable  Respiratory status: unassisted  Hydration status: euvolemic  Follow-up not needed.        Visit Vitals  /70   Pulse 88   Temp 36.5 °C (97.7 °F)   Resp 16   Ht 5' 7" (1.702 m)   Wt 61.7 kg (136 lb)   SpO2 99%   BMI 21.30 kg/m²       Pain/Jesse Score: Pain Assessment Performed: Yes (12/5/2017 11:00 AM)  Presence of Pain: denies (12/5/2017 11:00 AM)  Jesse Score: 10 (12/5/2017 11:00 AM)      "

## 2017-12-05 NOTE — H&P
Short Stay Endoscopy History and Physical    PCP - Primary Doctor No    Procedure - Colonoscopy  +EGD  ASA - per anesthesia  Mallampati - per anesthesia  History of Anesthesia problems - no  Family history Anesthesia problems - no   Plan of anesthesia - General    HPI:  This is a 52 y.o. male here for evaluation of : asymptomatic screening exam.  Hx of cirrhosis.  EGD for varices screening.  Colonoscopy for routine CRC screening.      ROS:  Constitutional: No fevers, chills, No weight loss  CV: No chest pain  Pulm: No cough, No shortness of breath  GI: see HPI  Derm: No rash    Medical History:  has a past medical history of Chronic hepatitis C.    Surgical History:  has a past surgical history that includes Hand surgery and Tympanostomy tube placement.    Family History: family history includes Breast cancer in his sister; Lung cancer in his father and mother; Stomach cancer in his brother.. Otherwise no colon cancer, inflammatory bowel disease, or GI malignancies.    Social History:  reports that he has been smoking.  He has a 20.00 pack-year smoking history. He has never used smokeless tobacco. He reports that he drinks alcohol.    Review of patient's allergies indicates:  No Known Allergies    Medications:   Prescriptions Prior to Admission   Medication Sig Dispense Refill Last Dose    acetaminophen (TYLENOL) 325 MG tablet Take 325 mg by mouth every 6 (six) hours as needed for Pain.   Past Week at Unknown time    furosemide (LASIX) 20 MG tablet Take 1 tablet (20 mg total) by mouth once daily. 30 tablet 1 12/5/2017 at 0600    lactulose (CHRONULAC) 10 gram/15 mL solution Take by mouth continuous prn.   Past Week at Unknown time    spironolactone (ALDACTONE) 50 MG tablet Take 1 tablet (50 mg total) by mouth once daily. 30 tablet 1 12/5/2017 at 0600    famotidine (PEPCID) 20 MG tablet TK 1 T PO Q 12 H PRF ABDOMINAL DISCOMFORT  0 More than a month at Unknown time         Physical Exam:    Vital Signs:    Vitals:    12/05/17 0905   BP: (!) 149/74   Pulse: 99   Resp: 17   Temp: 98.1 °F (36.7 °C)       General Appearance: Well appearing in no acute distress ; + jaundice  Eyes:    + scleral icterus  ENT: Neck supple, Lips, mucosa, and tongue normal; teeth and gums normal  Lungs: CTA bilaterally  Heart:  S1, S2 normal, no murmurs heard  Abdomen: Soft, non tender, mild distention without tendnerness. No rebound. No masses  Extremities: 2+ pulses, no clubbing, cyanosis or edema  Skin: No rash      Labs:  Lab Results   Component Value Date    WBC 7.62 12/05/2017    HGB 14.2 12/05/2017    HCT 40.8 12/05/2017     12/05/2017     (H) 12/05/2017     (H) 12/05/2017     12/05/2017    K 4.4 12/05/2017    CL 98 12/05/2017    CREATININE 0.8 12/05/2017    BUN 11 12/05/2017    CO2 28 12/05/2017    INR 1.3 (H) 12/05/2017       I have explained the risks and benefits of endoscopy procedures to the patient including but not limited to bleeding, perforation, infection, and death.      Gian Gordon MD

## 2017-12-05 NOTE — TRANSFER OF CARE
"Anesthesia Transfer of Care Note    Patient: Shahid Vizcarra    Procedure(s) Performed: Procedure(s) (LRB):  ESOPHAGOGASTRODUODENOSCOPY (EGD), EV screening (N/A)  COLONOSCOPY, BRBPR (N/A)    Patient location: PACU    Anesthesia Type: general    Transport from OR: Transported from OR on room air with adequate spontaneous ventilation    Post pain: adequate analgesia    Post assessment: no apparent anesthetic complications and tolerated procedure well    Post vital signs: stable    Level of consciousness: sedated    Nausea/Vomiting: no nausea/vomiting    Complications: none    Transfer of care protocol was followed      Last vitals:   Visit Vitals  BP (!) 149/74   Pulse 99   Temp 36.7 °C (98.1 °F)   Resp 17   Ht 5' 7" (1.702 m)   Wt 61.7 kg (136 lb)   SpO2 99%   BMI 21.30 kg/m²     "

## 2017-12-07 ENCOUNTER — OFFICE VISIT (OUTPATIENT)
Dept: HEPATOLOGY | Facility: CLINIC | Age: 52
End: 2017-12-07
Payer: MEDICAID

## 2017-12-07 VITALS
WEIGHT: 137.81 LBS | TEMPERATURE: 97 F | DIASTOLIC BLOOD PRESSURE: 72 MMHG | HEIGHT: 67 IN | SYSTOLIC BLOOD PRESSURE: 143 MMHG | HEART RATE: 105 BPM | RESPIRATION RATE: 18 BRPM | OXYGEN SATURATION: 100 % | BODY MASS INDEX: 21.63 KG/M2

## 2017-12-07 DIAGNOSIS — C22.0 HEPATOCELLULAR CARCINOMA: ICD-10-CM

## 2017-12-07 DIAGNOSIS — B18.2 CHRONIC HEPATITIS C WITHOUT HEPATIC COMA: ICD-10-CM

## 2017-12-07 DIAGNOSIS — R18.8 OTHER ASCITES: ICD-10-CM

## 2017-12-07 DIAGNOSIS — K74.60 CIRRHOSIS OF LIVER WITHOUT ASCITES, UNSPECIFIED HEPATIC CIRRHOSIS TYPE: Primary | ICD-10-CM

## 2017-12-07 PROBLEM — R16.0 LIVER MASS: Status: RESOLVED | Noted: 2017-11-21 | Resolved: 2017-12-07

## 2017-12-07 PROCEDURE — 99999 PR PBB SHADOW E&M-EST. PATIENT-LVL IV: CPT | Mod: PBBFAC,,, | Performed by: PHYSICIAN ASSISTANT

## 2017-12-07 PROCEDURE — 99215 OFFICE O/P EST HI 40 MIN: CPT | Mod: S$PBB,,, | Performed by: PHYSICIAN ASSISTANT

## 2017-12-07 PROCEDURE — 99214 OFFICE O/P EST MOD 30 MIN: CPT | Mod: PBBFAC | Performed by: PHYSICIAN ASSISTANT

## 2017-12-07 NOTE — PROGRESS NOTES
HEPATOLOGY CLINIC VISIT NOTE     REFERRING PROVIDER: Mouna Eason NP     REASON FOR VISIT: ascites, HCV     HISTORY: This is a 52 y.o. White male here for follow up. He was last seen by me 3 weeks ago for evaluation of ascites and a reported h/o liver mass. At initial consult, AFP drawn, significantly elevated >900. He was ordered to have a tpCT given h/o liver mass which showed several discrete liver masses as well as exteremly heterogenous liver echotexture. It was recommended that he have a MRI. This was done and noted diffuse infiltrative hepatocellular carcinoma with innumerable areas of washout. His scan was reviewed at IR and it was not felt that he was a candidate for locoregional therapy. Due to hepatic dysfunction, pt not a candidate for chemotherapy, so IR conference recommendations were to consult hospice. He is here today to discuss his prognoses. We have discussed his HCC diagnosis in great detail and I have answered questions posed by both patient and sister.                   Past Medical History:   Diagnosis Date    Chronic hepatitis C      Past Surgical History:   Procedure Laterality Date    COLONOSCOPY N/A 12/5/2017    Procedure: COLONOSCOPY, BRBPR;  Surgeon: Theodore Umaña MD;  Location: [facility] ([address] [place] [address]);  Service: Endoscopy;  Laterality: N/A;    HAND SURGERY      TYMPANOSTOMY TUBE PLACEMENT       FAMILY HISTORY: Negative for liver disease    SOCIAL HISTORY:   History   Smoking Status    Current Every Day Smoker    Packs/day: 0.50    Years: 40.00   Smokeless Tobacco    Never Used       History   Alcohol Use    Yes     Comment: once a month    beer- daily, 8-10 beers     History   Drug use: Unknown     ROS:   No chest pain or dyspnea   (+) abdominal pain, improving  (+) yellow and black stools- banded earlier this week      PHYSICAL EXAM:  Friendly White male, in no acute distress; alert and oriented to person, place and time  VITALS: reviewed  HEENT: Sclerae anicteric.   NECK:  Supple   LUNGS: Normal respiratory effort.  ABDOMEN: Flat, soft, nontender. (+) ascites  SKIN: Warm and dry. No jaundice, No obvious rashes.   EXTREMITIES: No lower extremity edema  NEURO/PSYCH: Normal gate. Memory intact. Thought and speech pattern appropriate. Behavior normal. No depression or anxiety noted.    RECENT LABS:  Lab Results   Component Value Date    WBC 7.62 12/05/2017    HGB 14.2 12/05/2017     12/05/2017     Lab Results   Component Value Date    INR 1.3 (H) 12/05/2017     Lab Results   Component Value Date     (H) 12/05/2017     (H) 12/05/2017    BILITOT 5.1 (H) 12/05/2017    ALBUMIN 2.2 (L) 12/05/2017    ALKPHOS 259 (H) 12/05/2017    CREATININE 0.8 12/05/2017    BUN 11 12/05/2017     12/05/2017    K 4.4 12/05/2017     (H) 11/15/2017     RECENT IMAGING:  MRI 12/2017  Narrative     [id number] 12/01/17  15:35:19 [id number] ([facility]) : MRI ABDOMEN W WO CONTRAST    SUPPLIED CLINICAL HISTORY:  cirrhosis; liver mass    ADDITIONAL CLINICAL HISTORY: N/A    TECHNIQUE: Liver MRI.  Coronal and axial T2 weighted fast spin echo images, axial T2 weighted and fat suppressed images, and axial in and out of phase images were obtained without contrast. 3-D T1 weighted spoiled gradient images without and with 10 mL Gadavist IV contrast in multiple phases.    COMPARISON: CT abdomen 11/15/17      FINDINGS:    The liver is enlarged and demonstrates a nodular contour in this patient with history of cirrhosis.  There is significant heterogeneity to liver parenchyma.  There is diffuse irregular heterogeneous enhancement of liver parenchyma with innumerable areas of washout identified on delayed phase sequences.  These findings are consistent in appearance with diffuse infiltrative hepatocellular carcinoma.  The largest regions of enhancement and washout will be detailed below with size measurements based on area of washout.    --Left hepatic lobe lesion, segment II, measuring 4.2 cm (series 12, image  29); previously 4.2 cm on recent CT examination.  --Hepatic segment VI lesion measuring 3.3 cm (series 12, image 66); previously 3.2 cm on recent CT examination.  There is T1 hyperintense material within this lesion which may represent necrosis with proteinaceous debris or possibly intralesional hemorrhage.  --Hepatic segment III lesion measuring 4.1 cm (series 12, image 44)    There is enhancing tumor thrombus involving a significant portion of the right portal vein with associated shunting of contrast into the main portal vein.    The spleen is enlarged.  The stomach, pancreas, and adrenal glands are unremarkable.  The kidneys are normal in appearance.    There is moderate volume ascites.  There is a recanalized umbilical vein.  The visualized portions of large and small bowel reveal no significant abnormality.  No significant adenopathy.    The aorta is normal in course and caliber.  Bone marrow signal is within normal limits.   Impression         Findings suspicious for diffuse infiltrative hepatocellular carcinoma with innumerable areas of washout.  Index lesions as detailed above.  There is intravascular extension of tumor with enhancing thrombus within the right portal vein.    Findings compatible  with cirrhosis and portal hypertension including a heterogeneous nodular liver, recanalized umbilical vein, splenomegaly, and moderate volume ascites.       ASSESSMENT  52 y.o. White male with:  1. ADVANCED HEPATOCELLULAR CARCINOMA  -- per MRI, infiltrative hepatocellular carcinoma with innumerable areas of washout  -- per IR recs, not eligible for locoregional therapy, LFTs prohibitive to chemo  -- hospice referral placed     1. DECOMPENSATED CIRRHOSIS WITH ASCITES  -- stable     (+)Portal HTN:   - EGD: banded     2. ASCITES  -- no significant fluid for tapping when last para ordered, briefly discussed pleurex cath, however will need more fluid for placement     3. CHRONIC HEPATITIS C  -- genotype  1a      PLAN:  1. Hospice consult ordered     Duration of visit: 50 minutes  Greater than 50% of visit spent counseling patient on HCC.     Thank you for allowing me to participate in the care of Shahid Khan PA-C

## 2017-12-11 ENCOUNTER — TELEPHONE (OUTPATIENT)
Dept: GASTROENTEROLOGY | Facility: CLINIC | Age: 52
End: 2017-12-11

## 2017-12-11 ENCOUNTER — TELEPHONE (OUTPATIENT)
Dept: HEPATOLOGY | Facility: CLINIC | Age: 52
End: 2017-12-11

## 2017-12-11 ENCOUNTER — OUTPATIENT CASE MANAGEMENT (OUTPATIENT)
Dept: ADMINISTRATIVE | Facility: OTHER | Age: 52
End: 2017-12-11

## 2017-12-11 DIAGNOSIS — R52 PAIN: Primary | ICD-10-CM

## 2017-12-11 RX ORDER — FUROSEMIDE 20 MG/1
20 TABLET ORAL 2 TIMES DAILY
Qty: 180 TABLET | Refills: 3 | Status: SHIPPED | OUTPATIENT
Start: 2017-12-11 | End: 2018-12-11

## 2017-12-11 RX ORDER — OXYCODONE AND ACETAMINOPHEN 2.5; 325 MG/1; MG/1
1 TABLET ORAL EVERY 4 HOURS PRN
Qty: 30 TABLET | Refills: 0 | Status: SHIPPED | OUTPATIENT
Start: 2017-12-11

## 2017-12-11 RX ORDER — SPIRONOLACTONE 50 MG/1
50 TABLET, FILM COATED ORAL 2 TIMES DAILY
Qty: 180 TABLET | Refills: 3 | Status: SHIPPED | OUTPATIENT
Start: 2017-12-11 | End: 2018-12-11

## 2017-12-11 NOTE — PROGRESS NOTES
Please note the following patients information has been forwarded to Medicaid for Case Management.    Please contact Outpatient Complex Care Management at ext [phone] with any questions.    Thank you      Triny Pack, SSC

## 2017-12-11 NOTE — TELEPHONE ENCOUNTER
Pt reporting increased abdominal distention, wants to double diuretics dose, new RX sent in    Pt requested pain RX, script done by Dr. Ramirez.     Phoned Triny with case management, per pt's insurance, cannot be followed by case management here, must be handled by his own insurance company. Information was sent to them today, he needs to call the number on the back of his card to be assigned /- please notify patient.

## 2017-12-12 ENCOUNTER — TELEPHONE (OUTPATIENT)
Dept: HEPATOLOGY | Facility: CLINIC | Age: 52
End: 2017-12-12

## 2017-12-12 ENCOUNTER — TELEPHONE (OUTPATIENT)
Dept: ENDOSCOPY | Facility: HOSPITAL | Age: 52
End: 2017-12-12

## 2017-12-12 NOTE — TELEPHONE ENCOUNTER
----- Message from Raymond Khan PA-C sent at 12/11/2017  9:51 AM CST -----  Hospice referral placed Thursday on this patient, how soon should they expect to be contacted?

## 2017-12-12 NOTE — TELEPHONE ENCOUNTER
Raymond informed that the patient will be contacted and message will be in the chart after the Referral is placed in.  You can also call Triny with Outpatient Complex Care Management at ext [phone].

## 2017-12-13 DIAGNOSIS — R52 PAIN: ICD-10-CM

## 2017-12-13 RX ORDER — OXYCODONE HYDROCHLORIDE 5 MG/1
5 TABLET ORAL EVERY 6 HOURS PRN
Qty: 30 TABLET | Refills: 0 | Status: SHIPPED | OUTPATIENT
Start: 2017-12-13 | End: 2017-12-13 | Stop reason: CLARIF

## 2017-12-13 RX ORDER — OXYCODONE HYDROCHLORIDE 5 MG/1
5 TABLET ORAL EVERY 6 HOURS PRN
Qty: 30 TABLET | Refills: 0 | Status: SHIPPED | OUTPATIENT
Start: 2017-12-13

## 2017-12-14 ENCOUNTER — TELEPHONE (OUTPATIENT)
Dept: ADMINISTRATIVE | Facility: OTHER | Age: 52
End: 2017-12-14

## 2017-12-14 NOTE — TELEPHONE ENCOUNTER
Christiana from [facility] called concerning Mr. Vizcarra. She wanted to speak with Dulce Maria from Palliative care. She was given the information to contact Dulce Maria concerning Mr. Vizcarra's Hospice care.    I inbasketed a message to Dulce Maria with Christiana;s contact info ([phone].    Triny Pack, [facility] [place] [phone]

## 2017-12-15 DIAGNOSIS — C22.0 HCC (HEPATOCELLULAR CARCINOMA): Primary | ICD-10-CM

## 2017-12-18 ENCOUNTER — TELEPHONE (OUTPATIENT)
Dept: HEPATOLOGY | Facility: CLINIC | Age: 52
End: 2017-12-18

## 2017-12-18 NOTE — TELEPHONE ENCOUNTER
----- Message from Triny Pack sent at 12/14/2017  9:00 AM CST -----  I received a call from Christiana with [facility]'s palliative care team concerning Mr. Vizcarra.  We received the referral for mr. Vizcarra. However, our dept.  does not follow Medicaid patients. His information was deferred to them. Christiana called to determine what Hospice agency Mr Vizcarra would be utilizing. We did not have this information. I gave her the information to contact your office.    Her contact information is [phone].    Thank you    Triny Pack, SSC

## 2017-12-22 ENCOUNTER — TELEPHONE (OUTPATIENT)
Dept: HEPATOLOGY | Facility: CLINIC | Age: 52
End: 2017-12-22

## 2017-12-22 NOTE — TELEPHONE ENCOUNTER
Received a call from Cheryl with [facility] at [phone].  Requesting for Dr Ramirez to fill in and sign the Hospice Certification of Terminal Illness forms.   It must be in be 5 pm today.  This is just for certification even if another Dr writes the orders.    Forms faxed to the clinic. Filled in, signed and faxed back to [phone] [facility]. Cheryl called back and forms received.

## 2017-12-26 ENCOUNTER — TELEPHONE (OUTPATIENT)
Dept: HEPATOLOGY | Facility: CLINIC | Age: 52
End: 2017-12-26

## 2017-12-26 NOTE — TELEPHONE ENCOUNTER
----- Message from Kira Churchill sent at 12/26/2017 11:38 AM CST -----  Contact: Mary/[facility]  Calling to let Dr Ramirez know of the death of a patient yesterday please call .

## 2017-12-28 ENCOUNTER — TELEPHONE (OUTPATIENT)
Dept: HEPATOLOGY | Facility: CLINIC | Age: 52
End: 2017-12-28

## 2017-12-28 NOTE — TELEPHONE ENCOUNTER
----- Message from Kira Churchill sent at 12/28/2017  9:34 AM CST -----  Contact: [facility]  Needs clinical notes from recent visit on 12/7 please fax to # [phone].
